# Patient Record
Sex: MALE | Race: WHITE | NOT HISPANIC OR LATINO | Employment: FULL TIME | URBAN - METROPOLITAN AREA
[De-identification: names, ages, dates, MRNs, and addresses within clinical notes are randomized per-mention and may not be internally consistent; named-entity substitution may affect disease eponyms.]

---

## 2018-01-14 ENCOUNTER — HOSPITAL ENCOUNTER (EMERGENCY)
Facility: HOSPITAL | Age: 53
Discharge: HOME | End: 2018-01-15
Attending: EMERGENCY MEDICINE | Admitting: EMERGENCY MEDICINE

## 2018-01-14 ENCOUNTER — APPOINTMENT (OUTPATIENT)
Dept: GENERAL RADIOLOGY | Facility: HOSPITAL | Age: 53
End: 2018-01-14

## 2018-01-14 DIAGNOSIS — R07.89 ATYPICAL CHEST PAIN: Primary | ICD-10-CM

## 2018-01-14 DIAGNOSIS — J10.1 INFLUENZA B: ICD-10-CM

## 2018-01-14 LAB
BASOPHILS # BLD AUTO: 0.03 10*3/MM3 (ref 0–0.2)
BASOPHILS NFR BLD AUTO: 0.4 % (ref 0–1.5)
DEPRECATED RDW RBC AUTO: 43.4 FL (ref 37–54)
EOSINOPHIL # BLD AUTO: 0.15 10*3/MM3 (ref 0–0.7)
EOSINOPHIL NFR BLD AUTO: 2 % (ref 0.3–6.2)
ERYTHROCYTE [DISTWIDTH] IN BLOOD BY AUTOMATED COUNT: 12 % (ref 11.5–14.5)
FLUAV AG NPH QL: NEGATIVE
FLUBV AG NPH QL IA: POSITIVE
HCT VFR BLD AUTO: 44.4 % (ref 40.4–52.2)
HGB BLD-MCNC: 14.6 G/DL (ref 13.7–17.6)
IMM GRANULOCYTES # BLD: 0 10*3/MM3 (ref 0–0.03)
IMM GRANULOCYTES NFR BLD: 0 % (ref 0–0.5)
LYMPHOCYTES # BLD AUTO: 2.73 10*3/MM3 (ref 0.9–4.8)
LYMPHOCYTES NFR BLD AUTO: 36.2 % (ref 19.6–45.3)
MCH RBC QN AUTO: 32.5 PG (ref 27–32.7)
MCHC RBC AUTO-ENTMCNC: 32.9 G/DL (ref 32.6–36.4)
MCV RBC AUTO: 98.9 FL (ref 79.8–96.2)
MONOCYTES # BLD AUTO: 0.62 10*3/MM3 (ref 0.2–1.2)
MONOCYTES NFR BLD AUTO: 8.2 % (ref 5–12)
NEUTROPHILS # BLD AUTO: 4.02 10*3/MM3 (ref 1.9–8.1)
NEUTROPHILS NFR BLD AUTO: 53.2 % (ref 42.7–76)
PLATELET # BLD AUTO: 220 10*3/MM3 (ref 140–500)
PMV BLD AUTO: 10.6 FL (ref 6–12)
RBC # BLD AUTO: 4.49 10*6/MM3 (ref 4.6–6)
WBC NRBC COR # BLD: 7.55 10*3/MM3 (ref 4.5–10.7)

## 2018-01-14 PROCEDURE — 93010 ELECTROCARDIOGRAM REPORT: CPT | Performed by: INTERNAL MEDICINE

## 2018-01-14 PROCEDURE — 93005 ELECTROCARDIOGRAM TRACING: CPT | Performed by: EMERGENCY MEDICINE

## 2018-01-14 PROCEDURE — 93005 ELECTROCARDIOGRAM TRACING: CPT

## 2018-01-14 PROCEDURE — 87804 INFLUENZA ASSAY W/OPTIC: CPT | Performed by: EMERGENCY MEDICINE

## 2018-01-14 PROCEDURE — 99284 EMERGENCY DEPT VISIT MOD MDM: CPT

## 2018-01-14 PROCEDURE — 84484 ASSAY OF TROPONIN QUANT: CPT | Performed by: EMERGENCY MEDICINE

## 2018-01-14 PROCEDURE — 85025 COMPLETE CBC W/AUTO DIFF WBC: CPT | Performed by: EMERGENCY MEDICINE

## 2018-01-14 PROCEDURE — 71046 X-RAY EXAM CHEST 2 VIEWS: CPT

## 2018-01-14 PROCEDURE — 80053 COMPREHEN METABOLIC PANEL: CPT | Performed by: EMERGENCY MEDICINE

## 2018-01-14 RX ORDER — SODIUM CHLORIDE 0.9 % (FLUSH) 0.9 %
10 SYRINGE (ML) INJECTION AS NEEDED
Status: DISCONTINUED | OUTPATIENT
Start: 2018-01-14 | End: 2018-01-15 | Stop reason: HOSPADM

## 2018-01-15 VITALS
RESPIRATION RATE: 16 BRPM | TEMPERATURE: 97.5 F | WEIGHT: 190 LBS | BODY MASS INDEX: 28.79 KG/M2 | HEIGHT: 68 IN | DIASTOLIC BLOOD PRESSURE: 96 MMHG | SYSTOLIC BLOOD PRESSURE: 145 MMHG | HEART RATE: 64 BPM | OXYGEN SATURATION: 97 %

## 2018-01-15 LAB
ALBUMIN SERPL-MCNC: 4.4 G/DL (ref 3.5–5.2)
ALBUMIN/GLOB SERPL: 1.9 G/DL
ALP SERPL-CCNC: 66 U/L (ref 39–117)
ALT SERPL W P-5'-P-CCNC: 24 U/L (ref 1–41)
ANION GAP SERPL CALCULATED.3IONS-SCNC: 13.4 MMOL/L
AST SERPL-CCNC: 20 U/L (ref 1–40)
BILIRUB SERPL-MCNC: 0.6 MG/DL (ref 0.1–1.2)
BUN BLD-MCNC: 17 MG/DL (ref 6–20)
BUN/CREAT SERPL: 11.4 (ref 7–25)
CALCIUM SPEC-SCNC: 9.9 MG/DL (ref 8.6–10.5)
CHLORIDE SERPL-SCNC: 97 MMOL/L (ref 98–107)
CO2 SERPL-SCNC: 25.6 MMOL/L (ref 22–29)
CREAT BLD-MCNC: 1.49 MG/DL (ref 0.76–1.27)
GFR SERPL CREATININE-BSD FRML MDRD: 50 ML/MIN/1.73
GLOBULIN UR ELPH-MCNC: 2.3 GM/DL
GLUCOSE BLD-MCNC: 83 MG/DL (ref 65–99)
POTASSIUM BLD-SCNC: 4.3 MMOL/L (ref 3.5–5.2)
PROT SERPL-MCNC: 6.7 G/DL (ref 6–8.5)
SODIUM BLD-SCNC: 136 MMOL/L (ref 136–145)
TROPONIN T SERPL-MCNC: <0.01 NG/ML (ref 0–0.03)

## 2018-01-15 NOTE — ED PROVIDER NOTES
" EMERGENCY DEPARTMENT ENCOUNTER    CHIEF COMPLAINT  Chief Complaint: Chest pain  History given by: patient   History limited by: n/a  Room Number: 11/11  PMD: No Known Provider      HPI:  Pt is a 52 y.o. male who presents complaining of episodic left sided chest pain that has been ongoing for the past 2 days. He describes the pain as a burning pain with intermittently radiates into his back. Pt also reports associated LUE numbness. Pt states that prior to the onset of the chest pain he had generalized fatigue, sore throat, and a cough. He states that about 3 days ago, he had palpitations described as a \"rapid heart rate\" that last for 2 days. Pt states that these sx improved after improving his diet, decreasing caffeine intake, and decreasing the amount he was smoking.     Duration:  2 days   Onset: gradual  Timing: constant   Location: left chest   Radiation: none  Quality: burining   Intensity/Severity: moderate  Progression: unchanged   Associated Symptoms: cough, LUE numbness, sore throat, palpitations   Aggravating Factors: none  Alleviating Factors: none  Previous Episodes: none    PAST MEDICAL HISTORY  Active Ambulatory Problems     Diagnosis Date Noted   • No Active Ambulatory Problems     Resolved Ambulatory Problems     Diagnosis Date Noted   • No Resolved Ambulatory Problems     No Additional Past Medical History       PAST SURGICAL HISTORY  Past Surgical History:   Procedure Laterality Date   • NEPHRECTOMY  2012    left kidney donated        FAMILY HISTORY  History reviewed. No pertinent family history.    SOCIAL HISTORY  Social History     Social History   • Marital status:      Spouse name: N/A   • Number of children: N/A   • Years of education: N/A     Occupational History   • Not on file.     Social History Main Topics   • Smoking status: Current Every Day Smoker     Packs/day: 0.75   • Smokeless tobacco: Not on file   • Alcohol use Yes      Comment: rarely   • Drug use: Yes     Special: " Marijuana   • Sexual activity: Not on file     Other Topics Concern   • Not on file     Social History Narrative   • No narrative on file       ALLERGIES  Review of patient's allergies indicates no known allergies.    REVIEW OF SYSTEMS  Review of Systems   Constitutional: Positive for fatigue. Negative for chills and fever.   HENT: Positive for sore throat. Negative for congestion.    Eyes: Negative.    Respiratory: Positive for cough. Negative for shortness of breath.    Cardiovascular: Positive for chest pain. Negative for leg swelling.   Gastrointestinal: Negative for abdominal pain, diarrhea and vomiting.   Genitourinary: Negative for difficulty urinating and dysuria.   Musculoskeletal: Negative for back pain and neck pain.   Skin: Negative for rash and wound.   Allergic/Immunologic: Negative.    Neurological: Positive for numbness (LUE). Negative for dizziness, weakness and headaches.   Psychiatric/Behavioral: Negative.    All other systems reviewed and are negative.      PHYSICAL EXAM  ED Triage Vitals   Temp Heart Rate Resp BP SpO2   01/14/18 2220 01/14/18 2220 01/14/18 2223 01/14/18 2238 01/14/18 2220   97.4 °F (36.3 °C) 85 16 137/84 98 %      Temp src Heart Rate Source Patient Position BP Location FiO2 (%)   01/14/18 2220 01/14/18 2220 01/14/18 2238 01/14/18 2238 --   Tympanic Monitor Lying Right arm        Physical Exam   Constitutional: He is oriented to person, place, and time and well-developed, well-nourished, and in no distress.   HENT:   Head: Normocephalic and atraumatic.   Eyes: EOM are normal. Pupils are equal, round, and reactive to light.   Neck: Normal range of motion. Neck supple.   Cardiovascular: Normal rate, regular rhythm and normal heart sounds.    Pulmonary/Chest: Effort normal and breath sounds normal. No respiratory distress.   Abdominal: Soft. There is no tenderness. There is no rebound and no guarding.   Musculoskeletal: Normal range of motion. He exhibits no edema.   Neurological:  He is alert and oriented to person, place, and time. He has normal sensation and normal strength.   Skin: Skin is warm and dry.   Psychiatric: Mood and affect normal.   Nursing note and vitals reviewed.      LAB RESULTS  Lab Results (last 24 hours)     Procedure Component Value Units Date/Time    CBC & Differential [[id number]] Collected:  01/14/18 2324    Specimen:  Blood Updated:  01/14/18 2334    Narrative:       The following orders were created for panel order CBC & Differential.  Procedure                               Abnormality         Status                     ---------                               -----------         ------                     CBC Auto Differential[[id number]]        Abnormal            Final result                 Please view results for these tests on the individual orders.    Comprehensive Metabolic Panel [[id number]]  (Abnormal) Collected:  01/14/18 2324    Specimen:  Blood Updated:  01/15/18 0000     Glucose 83 mg/dL      BUN 17 mg/dL      Creatinine 1.49 (H) mg/dL      Sodium 136 mmol/L      Potassium 4.3 mmol/L      Chloride 97 (L) mmol/L      CO2 25.6 mmol/L      Calcium 9.9 mg/dL      Total Protein 6.7 g/dL      Albumin 4.40 g/dL      ALT (SGPT) 24 U/L      AST (SGOT) 20 U/L      Alkaline Phosphatase 66 U/L      Total Bilirubin 0.6 mg/dL      eGFR Non African Amer 50 (L) mL/min/1.73      Globulin 2.3 gm/dL      A/G Ratio 1.9 g/dL      BUN/Creatinine Ratio 11.4     Anion Gap 13.4 mmol/L     Troponin [[id number]]  (Normal) Collected:  01/14/18 2324    Specimen:  Blood Updated:  01/15/18 0000     Troponin T <0.010 ng/mL     Narrative:       Troponin T Reference Ranges:  Less than 0.03 ng/mL:    Negative for AMI  0.03 to 0.09 ng/mL:      Indeterminant for AMI  Greater than 0.09 ng/mL: Positive for AMI    CBC Auto Differential [[id number]]  (Abnormal) Collected:  01/14/18 2324    Specimen:  Blood Updated:  01/14/18 2334     WBC 7.55 10*3/mm3      RBC 4.49 (L) 10*6/mm3      Hemoglobin  14.6 g/dL      Hematocrit 44.4 %      MCV 98.9 (H) fL      MCH 32.5 pg      MCHC 32.9 g/dL      RDW 12.0 %      RDW-SD 43.4 fl      MPV 10.6 fL      Platelets 220 10*3/mm3      Neutrophil % 53.2 %      Lymphocyte % 36.2 %      Monocyte % 8.2 %      Eosinophil % 2.0 %      Basophil % 0.4 %      Immature Grans % 0.0 %      Neutrophils, Absolute 4.02 10*3/mm3      Lymphocytes, Absolute 2.73 10*3/mm3      Monocytes, Absolute 0.62 10*3/mm3      Eosinophils, Absolute 0.15 10*3/mm3      Basophils, Absolute 0.03 10*3/mm3      Immature Grans, Absolute 0.00 10*3/mm3     Influenza Antigen, Rapid - Swab, Nasopharynx [[id number]]  (Abnormal) Collected:  01/14/18 0922    Specimen:  Swab from Nasopharynx Updated:  01/14/18 7765     Influenza A Ag, EIA Negative     Influenza B Ag, EIA Positive (A)          I ordered the above labs and reviewed the results    RADIOLOGY  XR Chest 2 View   Preliminary Result   No acute findings.                   I ordered the above noted radiological studies. Interpreted by radiologist. Reviewed by me in PACS.       PROCEDURES  Procedures    EKG           EKG time: 22:24  Rhythm/Rate: NSR 57  P waves and WY: nml  QRS, axis: nml   ST and T waves: nml     Interpreted Contemporaneously by me, independently viewed  No prior     PROGRESS AND CONSULTS  ED Course     22:58  Troponin, CMP, CBC, CXR, and EKG ordered.     23:04  BP- 137/84 HR- 66 Temp- 97.4 °F (36.3 °C) (Tympanic) O2 sat- 96%  Advised pt that the EKG shows NAD. Plan for labs. Pt understands and agrees with the plan, all questions answered.    00:30  BP- (!) 144/105 HR- 60 Temp- 97.4 °F (36.3 °C) (Tympanic) O2 sat- 95%  Rechecked the patient who is in NAD and is resting comfortably. Advised pt that he is Influenza B positive. Informed him of the plan for discharged. Pt understands and agrees with the plan, all questions answered.    MEDICAL DECISION MAKING  Results were reviewed/discussed with the patient and they were also made aware of online  access. Pt also made aware that some labs, such as cultures, will not be resulted during ER visit and follow up with PMD is necessary.     MDM  Number of Diagnoses or Management Options  Atypical chest pain:   Influenza B:      Amount and/or Complexity of Data Reviewed  Clinical lab tests: ordered and reviewed (Influenza B positive. Troponin is <0.010)  Tests in the radiology section of CPT®: reviewed and ordered (CXR shows NAD)  Tests in the medicine section of CPT®: ordered and reviewed (See EKG procedure note. )  Independent visualization of images, tracings, or specimens: yes    Patient Progress  Patient progress: stable         DIAGNOSIS  Final diagnoses:   Atypical chest pain   Influenza B       DISPOSITION  DISCHARGE    Patient discharged in stable condition.    Reviewed implications of results, diagnosis, meds, responsibility to follow up, warning signs and symptoms of possible worsening, potential complications and reasons to return to ER.    Patient/Family voiced understanding of above instructions.    Discussed plan for discharge, as there is no emergent indication for admission.  Pt/family is agreeable and understands need for follow up and repeat testing.  Pt is aware that discharge does not mean that nothing is wrong but it indicates no emergency is present that requires admission and they must continue care with follow-up as given below or physician of their choice.     FOLLOW-UP  [facility] [place], [place] PHYSICAN REFERRAL SERVICE  [place] [id number] [phone]    As needed         Medication List      Notice     No changes were made to your prescriptions during this visit.        Latest Documented Vital Signs:  As of 4:40 AM  BP- 145/96 HR- 64 Temp- 97.5 °F (36.4 °C) O2 sat- 97%    --  Documentation assistance provided by aiden Kebede for Dr Parson.  Information recorded by the aiden was done at my direction and has been verified and validated by thierno Kebede  01/15/18  0044       Baljinder Parson MD  01/15/18 0440

## 2018-01-15 NOTE — ED TRIAGE NOTES
Pt reports that he started having CP Wednesday and rapid heart rate. Pt reports some episodes or left arm numbness today and yesterday. Pt denies numbness at this time. Pt reports that his chest has been tight and burning constantly for a couple days.